# Patient Record
Sex: FEMALE | Race: ASIAN | NOT HISPANIC OR LATINO | URBAN - METROPOLITAN AREA
[De-identification: names, ages, dates, MRNs, and addresses within clinical notes are randomized per-mention and may not be internally consistent; named-entity substitution may affect disease eponyms.]

---

## 2017-09-27 NOTE — PATIENT DISCUSSION
Posterior Vitreous Detachment (PVD) Counseling: I have discussed the diagnosis of PVD with the patient and the possibility of a retinal tear or detachment. The signs/symptoms of a retinal tear/detachment were reviewed to include but not limited to pain, increase or change in flashes of light, increase or change in floaters, decreased vision, part of the vision missing, veils or any other ocular concerns. The patient was instructed to contact us immediately if they noticed any of the signs or symptoms of retinal detachment as noted above as the prognosis for any potential treatment options may be time related. Return for follow-up as scheduled.

## 2017-10-23 ENCOUNTER — IMPORTED ENCOUNTER (OUTPATIENT)
Dept: URBAN - METROPOLITAN AREA CLINIC 9 | Facility: CLINIC | Age: 47
End: 2017-10-23

## 2019-10-30 NOTE — PATIENT DISCUSSION
EPIRETINAL MEMBRANE, OS__. NOT VISUALLY SIGNIFICANT TO THE PATIENT AT THIS TIME. IF NEW SPECS ARE NOT HELPFUL THEN MAY CONSIDER CONSULTING WITH DR. Kenrick Porter TO EVALUATE.

## 2020-01-15 NOTE — PATIENT DISCUSSION
EPIRETINAL MEMBRANE, OS__.  VISUALLY SIGNIFICANT. REFER TO RETINA SPECIALIST DR Malika Carrasco FOR EVALUATION AND TREATMENT.

## 2020-07-15 NOTE — PATIENT DISCUSSION
EPIRETINAL MEMBRANE, OS__.  VISUALLY SIGNIFICANT. REITERATED TO PATIENT THE BENEFITS OF HAVING ERM PROCEDURE WITH DR. Ronny Carrillo. INFORMED HIM THAT GLASSES NOR DROPS  WILL NOT IMPROVE VISION. REFER BACK TO DR. Ronny Carrillo.

## 2020-07-31 NOTE — PATIENT DISCUSSION
EPIMACULAR MEMBRANE, OS:  VISUALLY SIGNIFICANT. DISCUSSED SURGICAL OPTIONS. PATIENT ELECTED TO RETURN NEXT MONTH FOR PRE-OP VISIT TO SCHEDULE PPV SURGERY.

## 2020-07-31 NOTE — PATIENT DISCUSSION
LETTER TO DR. Jimenez Right: [address] WITH [facility] ANESTHESIA. PLEASE FAX CLINICAL NOTE [phone].

## 2020-09-30 NOTE — PATIENT DISCUSSION
POST-OP DAY 1 EXAM S/P PPV/PEEL OS: Doing well today. Retina attached. IOP within acceptable limits. Start eyedrops in the surgical eye as instructed: Pred 4x/day, Cipro 4x/day, Atropine 2x/day. Take tylenol or ibuprofen for any mild eye pain or pressure. Retinal detachment and endophthalmitis precautions reviewed. Instructed to call immediately for worsening vision, eye pain, or eye discharge.

## 2020-09-30 NOTE — PATIENT DISCUSSION
Continue: ciprofloxacin (ciprofloxacin): drops: 0.3% 1 drop four times a day as directed into left eye

## 2020-10-16 NOTE — PATIENT DISCUSSION
POST-OP WEEK 2 EXAM: S/P PPV/PEEL OS: Doing well today. Retina attached. IOP within acceptable limits. Begin Pred taper tomorrow: 3X/DAY OS FOR 1 WK, THEN 2X/DAY FOR 1 WK, 1X/DAY FOR 1 WK THEN STOP. Discontinue Cipro and Atropine. Retinal detachment and endophthalmitis precautions reviewed. Instructed to call immediately for worsening vision, eye pain, or eye discharge.

## 2020-10-16 NOTE — PATIENT DISCUSSION
Stopped Today: ciprofloxacin (ciprofloxacin): drops: 0.3% 1 drop four times a day as directed into left eye

## 2020-11-20 NOTE — PATIENT DISCUSSION
POST-OP MONTH 2 EXAM S/P PPV/PEEL, OS: Doing well today. Retina attached. IOP within acceptable limits. Discontinue prednisolone. Retinal detachment and endophthalmitis precautions reviewed. Instructed to call immediately for worsening vision, eye pain, or eye discharge.

## 2020-12-07 ENCOUNTER — IMPORTED ENCOUNTER (OUTPATIENT)
Dept: URBAN - METROPOLITAN AREA CLINIC 9 | Facility: CLINIC | Age: 50
End: 2020-12-07

## 2021-03-23 NOTE — PATIENT DISCUSSION
Called patient and LMTCB to schedule appt    EPIRETINAL MEMBRANE, OS__. NOT VISUALLY SIGNIFICANT TO THE PATIENT AT THIS TIME. FOLLOW.

## 2021-03-24 ENCOUNTER — IMPORTED ENCOUNTER (OUTPATIENT)
Dept: URBAN - METROPOLITAN AREA CLINIC 9 | Facility: CLINIC | Age: 51
End: 2021-03-24

## 2021-03-24 PROBLEM — H04.561: Noted: 2021-03-24

## 2021-03-24 PROBLEM — H04.221: Noted: 2021-03-24

## 2021-08-06 NOTE — PATIENT DISCUSSION
RETINA IS ATTACHED OU: S/P PPV/PEEL OS; NO EPIRETINAL MEMBRANE; NO HOLES OR TEARS SEEN ON DILATED EXAM TODAY.  RETINAL DETACHMENT SIGNS AND SYMPTOMS REVIEWED

## 2021-08-06 NOTE — PATIENT DISCUSSION
GLAUCOMA SUSPECT, OU : ENLARGED OPTIC NERVE CUPPING. EXAM AND RNFL OCT FINDINGS SUGGEST GLAUCOMATOUS OPTIC NEUROPATHY  (NORMAL TENSION GLAUCOMA). REFER FOR GLAUCOMA EVALUATION AFTER CME TREATMENT.

## 2021-08-06 NOTE — PATIENT DISCUSSION
Injection Counseling: Patients treated with injection may notice eye irritation or  burning for 12-24 hours after injection. Some patients may have a small patch of hemorrhage under the skin of the eye, which will fade in a few days. Worsening pain, decreasing vision, or new floaters should prompt you to call the office.

## 2021-08-06 NOTE — PATIENT DISCUSSION
CYSTOID MACULAR EDEMA OS:  EXAM AND OCT FINDINGS SUGGEST CYSTOID DEGENERATION OFTEN ASSOCIATED WITH GLAUCOMA. EXPLAINED THAT TREATING CME MAY NOT RESULT IN VISION IMPROVEMENT. ALL TX OPTIONS DISCUSSED. PATIENT ELECTED TO PROCEED WITH TRIAL OF SUBTENON TRIAMCINOLONE.

## 2021-09-10 NOTE — PATIENT DISCUSSION
CYSTOID MACULAR EDEMA OS: EXAM AND OCT FINDINGS SUGGEST CYSTOID DEGENERATION OFTEN ASSOCIATED WITH GLAUCOMA. NO IMPROVEMENT WITH TRIAMCINOLONE INJECTION. NO FURTHER TREATMENT RECOMMENDED AT THIS TIME. CONTINUE TO OBSERVE. RETURN AS SCHEDULED.

## 2021-09-10 NOTE — PATIENT DISCUSSION
GLAUCOMA SUSPECT, OU: RETURN TO DR TERRELL FOR GLAUCOMA SCREENING AND TREATMENT. DISCUSSED THAT BCVA OS LIMITED BY OPTIC NERVE CHANGES.

## 2021-10-19 ASSESSMENT — VISUAL ACUITY
OD_SC: 20/25 SN
OD_SC: 20/25 - SN
OS_CC: 20/20 SN
OD_CC: 20/20 SN
OS_SC: 20/20 SN
OS_SC: 20/20 SN
OS_SC: 20/25 SN
OD_SC: 20/20 SN

## 2021-10-19 ASSESSMENT — TONOMETRY
OD_IOP_MMHG: 22
OS_IOP_MMHG: 16
OS_IOP_MMHG: 21
OS_IOP_MMHG: 14
OD_IOP_MMHG: 14
OD_IOP_MMHG: 16

## 2021-11-22 ENCOUNTER — ESTABLISHED PATIENT (OUTPATIENT)
Dept: URBAN - METROPOLITAN AREA CLINIC 17 | Facility: CLINIC | Age: 51
End: 2021-11-22

## 2021-11-22 DIAGNOSIS — H04.123: ICD-10-CM

## 2021-11-22 DIAGNOSIS — H25.013: ICD-10-CM

## 2021-11-22 DIAGNOSIS — H35.3131: ICD-10-CM

## 2021-11-22 DIAGNOSIS — E11.9: ICD-10-CM

## 2021-11-22 PROCEDURE — 92014 COMPRE OPH EXAM EST PT 1/>: CPT

## 2021-11-22 PROCEDURE — 92134 CPTRZ OPH DX IMG PST SGM RTA: CPT

## 2021-11-22 ASSESSMENT — TONOMETRY
OD_IOP_MMHG: 18
OS_IOP_MMHG: 16

## 2021-11-22 ASSESSMENT — VISUAL ACUITY
OS_SC: 20/20-2
OD_SC: 20/25

## 2021-11-22 NOTE — PATIENT DISCUSSION
The patient was informed that with the Basic option, they will most likely need prescription glasses at all focal points after surgery. The patient understands that with her amount of astigmatism, she may need temporary glasses right after surgery in order to see. We do not dispense the final prescription until the eyes are stable and healed from surgery which is typically about 3-4 weeks after the second eye has been done. The patient elects Basic OS, goal of emmetropia.

## 2022-01-14 NOTE — PATIENT DISCUSSION
Cataract surgery has been performed in the first eye and activities of daily living are still impaired. The patient would like to proceed with cataract surgery in the second eye as scheduled. The patient elects Basic IOL OD, emmetropia.

## 2022-01-14 NOTE — PATIENT DISCUSSION
1/14/22;  The patient is satisfied with improving distance vision of both eyes today, and is comfortable with waiting until her PO exam with Dr. Blayne Kwon to reassess if she would prefer temporary glasses during the 4 week period of healing.

## 2022-01-17 NOTE — PATIENT DISCUSSION
1/14/22;  The patient is satisfied with improving distance vision of both eyes today, and is comfortable with waiting until her PO exam with Dr. Saundra Hjai to reassess if she would prefer temporary glasses during the 4 week period of healing.

## 2022-07-01 RX ORDER — CANAGLIFLOZIN 300 MG/1
TABLET, FILM COATED ORAL
COMMUNITY

## 2022-07-01 RX ORDER — VALACYCLOVIR HYDROCHLORIDE 500 MG/1
TABLET, FILM COATED ORAL
COMMUNITY

## 2022-07-01 RX ORDER — BUSPIRONE HYDROCHLORIDE 5 MG/1
TABLET ORAL
COMMUNITY
Start: 2022-05-09

## 2022-07-01 RX ORDER — METFORMIN HYDROCHLORIDE 500 MG/1
TABLET, EXTENDED RELEASE ORAL
COMMUNITY

## 2022-07-01 RX ORDER — CALCIPOTRIENE, BETAMETHASONE DIPROPIONATE 50; .643 UG/G; MG/G
OINTMENT TOPICAL
COMMUNITY

## 2022-07-01 RX ORDER — DULAGLUTIDE 3 MG/.5ML
INJECTION, SOLUTION SUBCUTANEOUS
COMMUNITY

## 2022-07-01 RX ORDER — IXEKIZUMAB 80 MG/ML
INJECTION, SOLUTION SUBCUTANEOUS
COMMUNITY

## 2023-01-23 ENCOUNTER — ESTABLISHED PATIENT (OUTPATIENT)
Dept: URBAN - METROPOLITAN AREA CLINIC 17 | Facility: CLINIC | Age: 53
End: 2023-01-23

## 2023-01-23 DIAGNOSIS — H43.813: ICD-10-CM

## 2023-01-23 DIAGNOSIS — E11.9: ICD-10-CM

## 2023-01-23 DIAGNOSIS — H25.013: ICD-10-CM

## 2023-01-23 DIAGNOSIS — H04.123: ICD-10-CM

## 2023-01-23 DIAGNOSIS — H35.3131: ICD-10-CM

## 2023-01-23 PROCEDURE — 92134 CPTRZ OPH DX IMG PST SGM RTA: CPT

## 2023-01-23 PROCEDURE — 92014 COMPRE OPH EXAM EST PT 1/>: CPT

## 2023-01-23 PROCEDURE — 92015 DETERMINE REFRACTIVE STATE: CPT

## 2023-01-23 ASSESSMENT — TONOMETRY
OD_IOP_MMHG: 18
OS_IOP_MMHG: 19

## 2023-01-23 ASSESSMENT — VISUAL ACUITY
OS_GLARE: 20/100
OD_GLARE: 20/400
OS_SC: J2
OS_SC: 20/20-1
OD_SC: J7
OD_SC: 20/30+1

## 2023-12-26 ENCOUNTER — EMERGENCY VISIT (OUTPATIENT)
Facility: LOCATION | Age: 53
End: 2023-12-26

## 2023-12-26 DIAGNOSIS — S05.00XA: ICD-10-CM

## 2023-12-26 PROCEDURE — 99214 OFFICE O/P EST MOD 30 MIN: CPT

## 2023-12-26 PROCEDURE — 92071 CONTACT LENS FITTING FOR TX: CPT

## 2023-12-26 ASSESSMENT — VISUAL ACUITY
OS_SC: 20/25
OD_SC: 20/30

## 2023-12-27 ENCOUNTER — FOLLOW UP (OUTPATIENT)
Dept: URBAN - METROPOLITAN AREA CLINIC 10 | Facility: CLINIC | Age: 53
End: 2023-12-27

## 2023-12-27 DIAGNOSIS — S05.02XD: ICD-10-CM

## 2023-12-27 PROCEDURE — 99213 OFFICE O/P EST LOW 20 MIN: CPT

## 2023-12-27 ASSESSMENT — VISUAL ACUITY
OS_SC: 20/20
OU_SC: 20/20
OD_SC: 20/40-1

## 2024-09-04 ENCOUNTER — COMPREHENSIVE EXAM (OUTPATIENT)
Dept: URBAN - METROPOLITAN AREA CLINIC 17 | Facility: CLINIC | Age: 54
End: 2024-09-04

## 2024-09-04 DIAGNOSIS — H04.123: ICD-10-CM

## 2024-09-04 DIAGNOSIS — H25.13: ICD-10-CM

## 2024-09-04 DIAGNOSIS — H52.221: ICD-10-CM

## 2024-09-04 DIAGNOSIS — E11.9: ICD-10-CM

## 2024-09-04 DIAGNOSIS — H25.013: ICD-10-CM

## 2024-09-04 PROCEDURE — 99214 OFFICE O/P EST MOD 30 MIN: CPT

## 2024-09-04 PROCEDURE — 92134 CPTRZ OPH DX IMG PST SGM RTA: CPT | Mod: NC

## 2024-09-04 PROCEDURE — 92015 DETERMINE REFRACTIVE STATE: CPT | Mod: NC

## 2024-09-16 ENCOUNTER — PRE-OP/H&P (OUTPATIENT)
Dept: URBAN - METROPOLITAN AREA CLINIC 17 | Facility: CLINIC | Age: 54
End: 2024-09-16

## 2024-09-16 DIAGNOSIS — H25.13: ICD-10-CM

## 2024-09-16 DIAGNOSIS — H25.013: ICD-10-CM

## 2024-09-16 PROCEDURE — 92025 CPTRIZED CORNEAL TOPOGRAPHY: CPT | Mod: NC

## 2024-09-16 PROCEDURE — 92136 OPHTHALMIC BIOMETRY: CPT
